# Patient Record
Sex: MALE | Race: WHITE
[De-identification: names, ages, dates, MRNs, and addresses within clinical notes are randomized per-mention and may not be internally consistent; named-entity substitution may affect disease eponyms.]

---

## 2017-09-25 ENCOUNTER — HOSPITAL ENCOUNTER (OUTPATIENT)
Dept: HOSPITAL 6 - RAD | Age: 52
End: 2017-09-25
Attending: FAMILY MEDICINE
Payer: COMMERCIAL

## 2017-09-25 DIAGNOSIS — N50.89: Primary | ICD-10-CM

## 2018-10-17 ENCOUNTER — HOSPITAL ENCOUNTER (OUTPATIENT)
Dept: HOSPITAL 6 - RAD | Age: 53
End: 2018-10-17
Attending: FAMILY MEDICINE
Payer: COMMERCIAL

## 2018-10-17 DIAGNOSIS — E04.1: Primary | ICD-10-CM

## 2019-10-16 ENCOUNTER — HOSPITAL ENCOUNTER (OUTPATIENT)
Dept: HOSPITAL 19 - COL.RAD | Age: 54
End: 2019-10-16
Payer: COMMERCIAL

## 2019-10-16 DIAGNOSIS — M48.02: Primary | ICD-10-CM

## 2019-10-16 DIAGNOSIS — Z98.890: ICD-10-CM

## 2019-10-16 PROCEDURE — A9585 GADOBUTROL INJECTION: HCPCS

## 2022-09-07 ENCOUNTER — HOSPITAL ENCOUNTER (OUTPATIENT)
Dept: HOSPITAL 19 - COL.RAD | Age: 57
End: 2022-09-07
Payer: COMMERCIAL

## 2022-09-07 DIAGNOSIS — M79.641: Primary | ICD-10-CM

## 2023-02-13 ENCOUNTER — HOSPITAL ENCOUNTER (OUTPATIENT)
Dept: HOSPITAL 6 - LAB | Age: 58
End: 2023-02-13
Attending: FAMILY MEDICINE
Payer: COMMERCIAL

## 2023-02-13 DIAGNOSIS — R06.00: ICD-10-CM

## 2023-02-13 DIAGNOSIS — R06.02: Primary | ICD-10-CM

## 2023-02-13 LAB
BASOPHILS # BLD: 0.02 K/MM3 (ref 0.02–0.1)
CALCIUM SERPL-MCNC: 9.3 MG/DL (ref 8.3–10.5)
CO2 SERPL-SCNC: 22 MMOL/L (ref 22–29)
D DIMER PPP FEU-MCNC: 0.23 MG/L FEU (ref 0.15–0.5)
EOSINOPHIL # BLD: 0.09 K/MM3 (ref 0.04–0.4)
EOSINOPHIL NFR BLD: 1.6 % (ref 0–4)
ERYTHROCYTE [DISTWIDTH] IN BLOOD BY AUTOMATED COUNT: 11.9 % (ref 11.5–14.5)
GLUCOSE SERPL-MCNC: 181 MG/DL (ref 75–110)
HCT VFR BLD AUTO: 45.9 % (ref 42–52)
HGB BLD-MCNC: 15.9 G/DL (ref 13.5–18)
LYMPHOCYTES # BLD: 1.84 K/MM3 (ref 1.5–4)
MCH RBC QN AUTO: 31 PG (ref 27–31)
MCHC RBC AUTO-ENTMCNC: 35 G/DL (ref 33–37)
MCV RBC AUTO: 90 FL (ref 78–100)
MONOCYTES # BLD: 0.34 K/MM3 (ref 0.2–0.8)
NEUTROPHILS # BLD: 3.28 K/MM3 (ref 1.4–6.5)
PLATELET # BLD AUTO: 214 K/MM3 (ref 130–400)
PMV BLD AUTO: 8.9 FL (ref 7.4–10.4)
POTASSIUM SERPL-SCNC: 3.8 MMOL/L (ref 3.5–5.1)
RBC # BLD AUTO: 5.09 M/MM3 (ref 4.2–5.6)
SODIUM SERPL-SCNC: 138 MMOL/L (ref 136–145)
TROPONIN I SERPL-MCNC: < 0.03 NG/ML (ref ?–0.03)
WBC # BLD AUTO: 5.6 K/MM3 (ref 4.8–10.8)

## 2023-04-07 ENCOUNTER — HOSPITAL ENCOUNTER (OUTPATIENT)
Dept: HOSPITAL 6 - RAD | Age: 58
End: 2023-04-07
Attending: FAMILY MEDICINE
Payer: COMMERCIAL

## 2023-04-07 DIAGNOSIS — N40.0: ICD-10-CM

## 2023-04-07 DIAGNOSIS — K57.90: Primary | ICD-10-CM

## 2023-05-03 ENCOUNTER — HOSPITAL ENCOUNTER (OUTPATIENT)
Dept: HOSPITAL 19 - COL.CAR | Age: 58
Discharge: HOME | End: 2023-05-03
Attending: INTERNAL MEDICINE
Payer: COMMERCIAL

## 2023-05-03 VITALS — DIASTOLIC BLOOD PRESSURE: 64 MMHG | SYSTOLIC BLOOD PRESSURE: 91 MMHG | HEART RATE: 94 BPM

## 2023-05-03 VITALS — SYSTOLIC BLOOD PRESSURE: 88 MMHG | HEART RATE: 48 BPM | DIASTOLIC BLOOD PRESSURE: 58 MMHG

## 2023-05-03 VITALS — DIASTOLIC BLOOD PRESSURE: 61 MMHG | HEART RATE: 49 BPM | SYSTOLIC BLOOD PRESSURE: 92 MMHG

## 2023-05-03 VITALS — SYSTOLIC BLOOD PRESSURE: 96 MMHG | DIASTOLIC BLOOD PRESSURE: 65 MMHG | HEART RATE: 51 BPM

## 2023-05-03 VITALS — HEART RATE: 53 BPM | SYSTOLIC BLOOD PRESSURE: 85 MMHG | DIASTOLIC BLOOD PRESSURE: 79 MMHG | TEMPERATURE: 98 F

## 2023-05-03 VITALS — HEIGHT: 74.02 IN | BODY MASS INDEX: 27.59 KG/M2 | WEIGHT: 214.95 LBS

## 2023-05-03 VITALS — SYSTOLIC BLOOD PRESSURE: 90 MMHG | DIASTOLIC BLOOD PRESSURE: 57 MMHG | HEART RATE: 50 BPM

## 2023-05-03 DIAGNOSIS — R06.02: Primary | ICD-10-CM

## 2023-05-03 LAB
ANION GAP SERPL CALC-SCNC: 10 MMOL/L (ref 7–16)
APTT PPP: 32.4 SECONDS (ref 26–37)
BUN SERPL-MCNC: 9 MG/DL (ref 8–26)
CALCIUM SERPL-MCNC: 9.2 MG/DL (ref 8.4–10.2)
CHLORIDE SERPL-SCNC: 107 MMOL/L (ref 98–107)
CO2 SERPL-SCNC: 23 MMOL/L (ref 22–29)
CREAT SERPL-SCNC: 0.78 MG/DL (ref 0.72–1.25)
ERYTHROCYTE [DISTWIDTH] IN BLOOD BY AUTOMATED COUNT: 11.8 % (ref 11.5–14.5)
GLUCOSE SERPL-MCNC: 102 MG/DL (ref 70–99)
HCT VFR BLD AUTO: 43 % (ref 42–52)
HGB BLD-MCNC: 15.1 G/DL (ref 13.5–18)
INR BLD: 1 (ref 0.8–3)
MCH RBC QN AUTO: 31 PG (ref 27–31)
MCHC RBC AUTO-ENTMCNC: 35 G/DL (ref 33–37)
MCV RBC AUTO: 89 FL (ref 80–100)
PLATELET # BLD AUTO: 232 K/MM3 (ref 130–400)
PMV BLD AUTO: 9.4 FL (ref 7.4–10.4)
POTASSIUM SERPL-SCNC: 3.9 MMOL/L (ref 3.5–4.5)
PROTHROMBIN TIME: 11.4 SECONDS (ref 9.7–12.8)
RBC # BLD AUTO: 4.82 M/MM3 (ref 4.2–5.6)
SODIUM SERPL-SCNC: 140 MMOL/L (ref 136–145)

## 2023-05-03 PROCEDURE — C1894 INTRO/SHEATH, NON-LASER: HCPCS

## 2024-03-12 ENCOUNTER — HOSPITAL ENCOUNTER (OUTPATIENT)
Dept: HOSPITAL 19 - COL.CAR | Age: 59
Discharge: HOME | End: 2024-03-12
Attending: INTERNAL MEDICINE
Payer: COMMERCIAL

## 2024-03-12 VITALS — SYSTOLIC BLOOD PRESSURE: 126 MMHG | HEART RATE: 56 BPM | DIASTOLIC BLOOD PRESSURE: 86 MMHG

## 2024-03-12 VITALS — WEIGHT: 213.41 LBS | HEIGHT: 72.01 IN | BODY MASS INDEX: 28.91 KG/M2

## 2024-03-12 VITALS — HEART RATE: 62 BPM | DIASTOLIC BLOOD PRESSURE: 83 MMHG | SYSTOLIC BLOOD PRESSURE: 123 MMHG

## 2024-03-12 VITALS — DIASTOLIC BLOOD PRESSURE: 85 MMHG | TEMPERATURE: 98.1 F | HEART RATE: 70 BPM | SYSTOLIC BLOOD PRESSURE: 132 MMHG

## 2024-03-12 VITALS — SYSTOLIC BLOOD PRESSURE: 125 MMHG | HEART RATE: 58 BPM | DIASTOLIC BLOOD PRESSURE: 80 MMHG

## 2024-03-12 VITALS — HEART RATE: 66 BPM | SYSTOLIC BLOOD PRESSURE: 129 MMHG | DIASTOLIC BLOOD PRESSURE: 86 MMHG

## 2024-03-12 DIAGNOSIS — R53.83: ICD-10-CM

## 2024-03-12 DIAGNOSIS — K21.9: ICD-10-CM

## 2024-03-12 DIAGNOSIS — E78.2: ICD-10-CM

## 2024-03-12 DIAGNOSIS — R06.02: ICD-10-CM

## 2024-03-12 DIAGNOSIS — Z87.891: ICD-10-CM

## 2024-03-12 DIAGNOSIS — R07.9: ICD-10-CM

## 2024-03-12 DIAGNOSIS — I77.819: ICD-10-CM

## 2024-03-12 DIAGNOSIS — Z79.899: ICD-10-CM

## 2024-03-12 DIAGNOSIS — Q21.12: Primary | ICD-10-CM

## 2024-03-12 LAB
ANION GAP SERPL CALC-SCNC: 10 MMOL/L (ref 7–16)
APTT PPP: 30.5 SECONDS (ref 26–37)
BUN SERPL-MCNC: 8 MG/DL (ref 8–26)
CALCIUM SERPL-MCNC: 9.5 MG/DL (ref 8.4–10.2)
CHLORIDE SERPL-SCNC: 105 MMOL/L (ref 98–107)
CO2 SERPL-SCNC: 26 MMOL/L (ref 22–29)
CREAT SERPL-SCNC: 0.82 MG/DL (ref 0.72–1.25)
ERYTHROCYTE [DISTWIDTH] IN BLOOD BY AUTOMATED COUNT: 12.6 % (ref 11.5–14.5)
GLUCOSE SERPL-MCNC: 101 MG/DL (ref 70–99)
HCT VFR BLD AUTO: 41.5 % (ref 42–52)
HGB BLD-MCNC: 14.9 G/DL (ref 13.5–18)
INR BLD: 1 (ref 0.8–3)
MCH RBC QN AUTO: 32 PG (ref 27–31)
MCHC RBC AUTO-ENTMCNC: 36 G/DL (ref 33–37)
MCV RBC AUTO: 89 FL (ref 80–100)
PLATELET # BLD AUTO: 239 K/MM3 (ref 130–400)
PMV BLD AUTO: 9.1 FL (ref 7.4–10.4)
POTASSIUM SERPL-SCNC: 4 MMOL/L (ref 3.5–4.5)
PROTHROMBIN TIME: 11.1 SECONDS (ref 9.7–12.8)
RBC # BLD AUTO: 4.68 M/MM3 (ref 4.2–5.6)
SODIUM SERPL-SCNC: 141 MMOL/L (ref 136–145)

## 2024-03-12 NOTE — NUR
pt tolerated recovery period well. vs remained within normal limits and pt
tolerated po fluids. IV discontinued upon discharge and pt verbalized
understanding of discharge instructions. pt free from acute concerns and
complaints and was assited to main lobby via wheelchair upon discharge.

## 2024-09-04 ENCOUNTER — HOSPITAL ENCOUNTER (OUTPATIENT)
Dept: HOSPITAL 6 - RAD | Age: 59
End: 2024-09-04
Attending: FAMILY MEDICINE
Payer: COMMERCIAL

## 2024-09-04 DIAGNOSIS — Z01.818: Primary | ICD-10-CM

## 2024-09-04 DIAGNOSIS — G89.29: ICD-10-CM

## 2024-09-04 DIAGNOSIS — M25.562: ICD-10-CM
